# Patient Record
Sex: MALE | URBAN - METROPOLITAN AREA
[De-identification: names, ages, dates, MRNs, and addresses within clinical notes are randomized per-mention and may not be internally consistent; named-entity substitution may affect disease eponyms.]

---

## 2019-06-27 ENCOUNTER — APPOINTMENT (RX ONLY)
Dept: URBAN - METROPOLITAN AREA CLINIC 303 | Facility: CLINIC | Age: 24
Setting detail: DERMATOLOGY
End: 2019-06-27

## 2019-06-27 DIAGNOSIS — D22 MELANOCYTIC NEVI: ICD-10-CM

## 2019-06-27 PROBLEM — D22.39 MELANOCYTIC NEVI OF OTHER PARTS OF FACE: Status: ACTIVE | Noted: 2019-06-27

## 2019-06-27 PROCEDURE — ? PHOTO-DOCUMENTATION

## 2019-06-27 PROCEDURE — ? OBSERVATION AND MEASURE

## 2019-06-27 PROCEDURE — ? DEFER

## 2019-06-27 PROCEDURE — 99201: CPT

## 2019-06-27 PROCEDURE — ? INVENTORY

## 2019-06-27 ASSESSMENT — LOCATION SIMPLE DESCRIPTION DERM: LOCATION SIMPLE: LEFT CHEEK

## 2019-06-27 ASSESSMENT — LOCATION ZONE DERM: LOCATION ZONE: FACE

## 2019-06-27 ASSESSMENT — LOCATION DETAILED DESCRIPTION DERM: LOCATION DETAILED: LEFT SUPERIOR NASAL CHEEK

## 2019-06-27 NOTE — PROCEDURE: DEFER
Detail Level: Detailed
Procedure To Be Performed At Next Visit: Biopsy by shave method
Scheduling Instructions (Optional): with Dr. Ng
Introduction Text (Please End With A Colon): The following procedure was deferred:

## 2019-07-24 ENCOUNTER — APPOINTMENT (RX ONLY)
Dept: URBAN - METROPOLITAN AREA CLINIC 303 | Facility: CLINIC | Age: 24
Setting detail: DERMATOLOGY
End: 2019-07-24

## 2019-07-24 DIAGNOSIS — D22 MELANOCYTIC NEVI: ICD-10-CM

## 2019-07-24 PROBLEM — D48.5 NEOPLASM OF UNCERTAIN BEHAVIOR OF SKIN: Status: ACTIVE | Noted: 2019-07-24

## 2019-07-24 PROCEDURE — 11102 TANGNTL BX SKIN SINGLE LES: CPT

## 2019-07-24 PROCEDURE — ? BIOPSY BY SHAVE METHOD

## 2019-07-24 ASSESSMENT — LOCATION SIMPLE DESCRIPTION DERM: LOCATION SIMPLE: LEFT NOSE

## 2019-07-24 ASSESSMENT — LOCATION DETAILED DESCRIPTION DERM: LOCATION DETAILED: LEFT NASAL SIDEWALL

## 2019-07-24 ASSESSMENT — LOCATION ZONE DERM: LOCATION ZONE: NOSE

## 2019-07-24 NOTE — PROCEDURE: BIOPSY BY SHAVE METHOD
Biopsy Type: H and E
Consent: Written consent was obtained and risks were reviewed including but not limited to scarring, infection, bleeding, scabbing, incomplete removal, nerve damage and allergy to anesthesia.
X Size Of Lesion In Cm: 0
Render Post-Care Instructions In Note?: no
Anesthesia Volume In Cc (Will Not Render If 0): 0.5
Type Of Destruction Used: Curettage
Electrodesiccation And Curettage Text: The wound bed was treated with electrodesiccation and curettage after the biopsy was performed.
Silver Nitrate Text: The wound bed was treated with silver nitrate after the biopsy was performed.
Dressing: bandage
Biopsy Method: Dermablade
Hemostasis: Drysol
Was A Bandage Applied: Yes
Lab: [id number]
Billing Type: Third-Party Bill
Detail Level: Detailed
Curettage Text: The wound bed was treated with curettage after the biopsy was performed.
Anesthesia Type: 1% lidocaine with epinephrine
Cryotherapy Text: The wound bed was treated with cryotherapy after the biopsy was performed.
Post-Care Instructions: I reviewed with the patient in detail post-care instructions. Patient is to keep the biopsy site dry overnight, and then apply bacitracin twice daily until healed. Patient may apply hydrogen peroxide soaks to remove any crusting.
Notification Instructions: Patient will be notified of biopsy results. However, patient instructed to call the office if not contacted within 2 weeks.
Lab Facility: [id number]
Depth Of Biopsy: dermis
Electrodesiccation Text: The wound bed was treated with electrodesiccation after the biopsy was performed.
Wound Care: Petrolatum